# Patient Record
Sex: FEMALE | URBAN - METROPOLITAN AREA
[De-identification: names, ages, dates, MRNs, and addresses within clinical notes are randomized per-mention and may not be internally consistent; named-entity substitution may affect disease eponyms.]

---

## 2024-04-30 ENCOUNTER — LAB REQUISITION (OUTPATIENT)
Dept: LAB | Facility: CLINIC | Age: 54
End: 2024-04-30
Payer: COMMERCIAL

## 2024-04-30 DIAGNOSIS — D22.72 MELANOCYTIC NEVI OF LEFT LOWER LIMB, INCLUDING HIP: ICD-10-CM

## 2024-04-30 PROCEDURE — 88305 TISSUE EXAM BY PATHOLOGIST: CPT | Mod: TC,ORL | Performed by: DERMATOLOGY

## 2024-04-30 PROCEDURE — 88305 TISSUE EXAM BY PATHOLOGIST: CPT | Mod: 26 | Performed by: DERMATOLOGY

## 2024-05-02 LAB
PATH REPORT.COMMENTS IMP SPEC: NORMAL
PATH REPORT.COMMENTS IMP SPEC: NORMAL
PATH REPORT.FINAL DX SPEC: NORMAL
PATH REPORT.GROSS SPEC: NORMAL
PATH REPORT.MICROSCOPIC SPEC OTHER STN: NORMAL
PATH REPORT.RELEVANT HX SPEC: NORMAL

## 2025-05-16 ENCOUNTER — TELEPHONE (OUTPATIENT)
Dept: VASCULAR SURGERY | Facility: CLINIC | Age: 55
End: 2025-05-16
Payer: COMMERCIAL

## 2025-05-16 NOTE — TELEPHONE ENCOUNTER
Vascular Referral Intake    Appointment note (to be pasted into appt note. Also add where additional info is located ie: outside images pushed to PACS, in Epic, sent to HIM, etc):   Referred by self for Lymphedema; tx care from [facility]    Specialty: Vascular Medicine    Specific Provider if Necessary:  Dr. Santhosh Bacon    Visit Type: New    Time Frame: Next Available    Testing/Imaging Needed Before Consult: None    Salazar or bed needed: No    *Schedulers: Please send welcome letter to patient after appointment(s) scheduled*

## 2025-05-21 NOTE — TELEPHONE ENCOUNTER
1st attempt I left the pt a voicemail to call back and schedule.     Referred by self for Lymphedema; tx care from [facility]     Specialty: Vascular Medicine     Specific Provider if Necessary:  Dr. Santhosh Bacon     Visit Type: New     Time Frame: Next Available     Testing/Imaging Needed Before Consult: None     Salazar or bed needed: Mary Landeros on 5/21/2025 at 9:37 AM

## 2025-05-21 NOTE — TELEPHONE ENCOUNTER
Patient has been scheduled for a consult with Dr. Bacon in August. Pt has been added to the [facility]. Will send welcome letter once MyChart has been set up.

## 2025-06-08 ENCOUNTER — HEALTH MAINTENANCE LETTER (OUTPATIENT)
Age: 55
End: 2025-06-08